# Patient Record
Sex: FEMALE
[De-identification: names, ages, dates, MRNs, and addresses within clinical notes are randomized per-mention and may not be internally consistent; named-entity substitution may affect disease eponyms.]

---

## 2020-12-02 ENCOUNTER — TRANSCRIPTION ENCOUNTER (OUTPATIENT)
Age: 25
End: 2020-12-02

## 2020-12-06 ENCOUNTER — TRANSCRIPTION ENCOUNTER (OUTPATIENT)
Age: 25
End: 2020-12-06

## 2021-05-15 ENCOUNTER — TRANSCRIPTION ENCOUNTER (OUTPATIENT)
Age: 26
End: 2021-05-15

## 2021-08-17 ENCOUNTER — TRANSCRIPTION ENCOUNTER (OUTPATIENT)
Age: 26
End: 2021-08-17

## 2021-11-01 ENCOUNTER — APPOINTMENT (OUTPATIENT)
Dept: HEMATOLOGY ONCOLOGY | Facility: CLINIC | Age: 26
End: 2021-11-01

## 2021-11-01 PROBLEM — Z00.00 ENCOUNTER FOR PREVENTIVE HEALTH EXAMINATION: Status: ACTIVE | Noted: 2021-11-01

## 2021-11-09 ENCOUNTER — TRANSCRIPTION ENCOUNTER (OUTPATIENT)
Age: 26
End: 2021-11-09

## 2021-11-11 ENCOUNTER — NON-APPOINTMENT (OUTPATIENT)
Age: 26
End: 2021-11-11

## 2021-11-21 NOTE — DISCUSSION/SUMMARY
[FreeTextEntry1] : CANCER GENETICS CONSULT NOTE – RESULTS DISCLOSURE \par \par REASON FOR CONSULT:\par Ms. Ani Dougherty is a 26-year-old female referred by Dr. Jordan Krishnamurthy for cancer genetic counseling and risk assessment due to a family history of cancer and a paternal aunt who tested positive for a BRCA1 mutation as well as an DM gene mutation.  \par    \par Ms. Dougherty received cancer genetic counseling on 11/01/2021 where a personal and family history of cancer was obtained, and germline genetic testing was ordered.  Ms. Dougherty was informed of her genetic test results over the telephone on 11/11/2021.    \par \par TEST RESULTS:\par Ms. Dougherty consented to receive genetic testing to fully sequence both the BRCA1 gene and the DM gene.  A blood specimen was drawn on 11/01/2021 and sent to [facility] for analysis.  The results report is dated 11/11/2021.     \par \par RESULTS:  No pathogenic variants were identified in the DM gene nor in the BRCA1 gene.  \par \par The mutations detected in her paternal aunt, specifically DM c.8786+1G>T and BRCA1 c.4484G>T were not detected in Ms. Dougherty.      \par \par No variants of uncertain clinical significance (VUSs) were identified in her DM and BRCA1 genes.  \par   \par ASSESSMENT AND PLAN:\par Given Ms. Dougherty’s genetic test results, her personal history and cancer family history it is unlikely that she has a hereditary cancer susceptibility syndrome.  \par \par Implications for the patient:\par In light of these genetic test results and the patient’s personal and family history it was recommended that she continue to follow cancer screening guidelines for individuals in the general population.  \par \par It was discussed that although these genetic test results are reassuring, they do not entirely rule out the possibility of hereditary cancer risk.  Ms. Dougherty’s results do not exclude the possibility of an undetected mutation in the genes tested and do not exclude the possibility of a mutation in either known or unknown cancer susceptibility genes not tested.  \par \par Ms. Dougherty was informed that our knowledge of genetics and inherited cancer conditions is changing rapidly. Testing guidelines, classification and interpretation of genetic test results, and techniques for cancer prevention and screening are constantly being reviewed, updated, and improved. Therefore, it was recommended that Ms. Dougherty contact our office, every 3 to 5 years, to discuss relevant advances in cancer genetics.  The importance of re-contacting us with updates regarding her personal and family history as well as updates regarding additional cancer genetic test results performed for her and/or family members was emphasized.  Such updates could possibly change our risk assessment and recommendations. \par \par Implications for family members:\par It was recommended that Ms. Dougherty’s two brothers also receive genetic counseling and genetic testing as they are at risk for these two mutations.  Ms. Dougherty’s negative test results do not exclude them from having a cancer susceptibility genetic variant.  \par \par FOLLOW UP:\par •	Ms. Dougherty to contact us with updates regarding her personal and family history and to check-in with us every 3-5 years.  \par •	We would be happy to see Ms. Dougherty’s brothers for cancer genetic counseling.  If they do not live locally they may find genetic services through the [employer] Counsel[employer] (www.nsgc.org/findageneticcounselor) or the National Cancer [place] [facility] Genetics Services Directory (www.cancer.gov/cancertopics/genetics/directory).  \par \par We remain available to Ms. Dougherty for questions, comments, or concerns.  For additional questions please call Cancer Genetics at [phone]. \par \par \par Vida Kelly, MS, [facility] [place]\par Sr. Genetic Counselor, Cancer Genetics \par [facility] [place] \par [facility] \par \par \par cc:\par Ani Dougherty\par Dr. Jordan Krishnamurthy \par   \par

## 2022-05-23 ENCOUNTER — NON-APPOINTMENT (OUTPATIENT)
Age: 27
End: 2022-05-23